# Patient Record
Sex: FEMALE | NOT HISPANIC OR LATINO | URBAN - METROPOLITAN AREA
[De-identification: names, ages, dates, MRNs, and addresses within clinical notes are randomized per-mention and may not be internally consistent; named-entity substitution may affect disease eponyms.]

---

## 2017-02-16 ENCOUNTER — IMPORTED ENCOUNTER (OUTPATIENT)
Dept: URBAN - METROPOLITAN AREA CLINIC 1 | Facility: CLINIC | Age: 82
End: 2017-02-16

## 2017-02-16 PROBLEM — H40.013: Noted: 2017-02-16

## 2017-02-16 PROBLEM — H25.813: Noted: 2017-02-16

## 2017-02-16 PROBLEM — H43.812: Noted: 2017-02-16

## 2017-02-16 PROCEDURE — 92250 FUNDUS PHOTOGRAPHY W/I&R: CPT

## 2017-02-16 PROCEDURE — 92015 DETERMINE REFRACTIVE STATE: CPT

## 2017-02-16 PROCEDURE — 92004 COMPRE OPH EXAM NEW PT 1/>: CPT

## 2017-02-16 NOTE — PATIENT DISCUSSION
1.  Cataract OU:  Visually Significant discussed the risks benefits alternatives and limitations of cataract surgery. The patient stated a full understanding and a desire to proceed with the procedure. The patient will need to return for preop appointment with cataract measurements and to have any additional questions answered and start pre-operative eye drops as directed. Schedule phaco PCL OS then ODOtherwise follow-up in 1-4 months 10/OCT/HVF2. Glaucoma Suspect OU (0.8/0.65): Stable IOP OU. Baseline disc photos OU. Further w/u after phaco. Patient is considered Low Risk. Condition was discussed with patient and patient understands. Will continue to monitor patient for any progression in condition. Patient was advised to call us with any problems questions or concerns. 3.  PVD w/o Tear OS-Patient was cautioned to call our office immediately if they experience a substantial change in their symptoms such as an increase in floaters persistent flashes loss of visual field (may appear as a shadow or a curtain) or decrease in visual acuity as these may indicate a retinal tear or detachment. 4.  Return for an appointment for edel H&P with Dr. Rafiq Oliveira.

## 2017-05-02 ENCOUNTER — IMPORTED ENCOUNTER (OUTPATIENT)
Dept: URBAN - METROPOLITAN AREA CLINIC 1 | Facility: CLINIC | Age: 82
End: 2017-05-02

## 2017-05-02 PROBLEM — H25.813: Noted: 2017-05-02

## 2017-05-02 PROCEDURE — 92136 OPHTHALMIC BIOMETRY: CPT

## 2017-05-02 NOTE — PATIENT DISCUSSION
Cataract OU:  Visually Significant discussed the risks benefits alternatives and limitations of cataract surgery. The patient stated a full understanding and a desire to proceed with the procedure. The patient will need to start pre-operative eye drops as directed. Proceed w/ phaco PCL OS then ODPt understands she will need glasses post-op to achieve her best vision at near.  RTC as scheduled for sx OS

## 2017-05-10 ENCOUNTER — IMPORTED ENCOUNTER (OUTPATIENT)
Dept: URBAN - METROPOLITAN AREA CLINIC 1 | Facility: CLINIC | Age: 82
End: 2017-05-10

## 2017-05-11 ENCOUNTER — IMPORTED ENCOUNTER (OUTPATIENT)
Dept: URBAN - METROPOLITAN AREA CLINIC 1 | Facility: CLINIC | Age: 82
End: 2017-05-11

## 2017-05-11 PROBLEM — Z96.1: Noted: 2017-05-11

## 2017-05-11 PROCEDURE — 99024 POSTOP FOLLOW-UP VISIT: CPT

## 2017-05-11 NOTE — PATIENT DISCUSSION
POD#1 CE/IOL Toric OS doing well. Slight inflammation. Continue all 3 gtts as prescribed and until gone. Ocuflox TIDPred BIDAcular QDPost op Warnings Reiterated RTC as scheduled

## 2017-05-16 ENCOUNTER — IMPORTED ENCOUNTER (OUTPATIENT)
Dept: URBAN - METROPOLITAN AREA CLINIC 1 | Facility: CLINIC | Age: 82
End: 2017-05-16

## 2017-05-16 PROBLEM — Z96.1: Noted: 2017-05-16

## 2017-05-16 PROBLEM — H25.811: Noted: 2017-05-16

## 2017-05-16 PROCEDURE — 92136 OPHTHALMIC BIOMETRY: CPT

## 2017-05-16 NOTE — PATIENT DISCUSSION
1.  Cataract OD: Visually Significant discussed the risks benefits alternatives and limitations of cataract surgery. The patient stated a full understanding and a desire to proceed with the procedure. Pt understands they will need glasses post-op to achieve their best vision at near. Phaco PCL Toric Standard technique. 2.  POW#1  CE/IOL OS (Toric) doing well.   Use Prednisolone BID OD till out Use Acular Qdaily OD till out F/u as scheduled for second eye sx

## 2017-05-24 ENCOUNTER — IMPORTED ENCOUNTER (OUTPATIENT)
Dept: URBAN - METROPOLITAN AREA CLINIC 1 | Facility: CLINIC | Age: 82
End: 2017-05-24

## 2017-05-25 ENCOUNTER — IMPORTED ENCOUNTER (OUTPATIENT)
Dept: URBAN - METROPOLITAN AREA CLINIC 1 | Facility: CLINIC | Age: 82
End: 2017-05-25

## 2017-05-25 PROBLEM — Z96.1: Noted: 2017-05-25

## 2017-05-25 PROCEDURE — 99024 POSTOP FOLLOW-UP VISIT: CPT

## 2017-05-25 NOTE — PATIENT DISCUSSION
1. POD#1 CE/IOL OD (Toric) doing well. Continue all 3 gtts as prescribed and until gone. Use Prednisolone BID OD Acular Qdaily OD Ocuflox TID OD 2. POW#1  CE/IOL OS (Toric) doing well.   Use Prednisolone BID OS till out Use Acular Qdaily OS till out RTC as scheduled

## 2017-06-16 ENCOUNTER — IMPORTED ENCOUNTER (OUTPATIENT)
Dept: URBAN - METROPOLITAN AREA CLINIC 1 | Facility: CLINIC | Age: 82
End: 2017-06-16

## 2017-06-16 PROBLEM — Z96.1: Noted: 2017-06-16

## 2017-06-16 PROCEDURE — 99024 POSTOP FOLLOW-UP VISIT: CPT

## 2017-06-16 NOTE — PATIENT DISCUSSION
1. POM#1 CE/IOL OU (Toric OU) doing well. Use Prednisolone BID OU till out Use Acular Qdaily OU till out MRX for glasses givenReturn for an appointment in 4-6 months 30/OCT/HVF with Dr. Julian Larson.

## 2017-10-30 ENCOUNTER — IMPORTED ENCOUNTER (OUTPATIENT)
Dept: URBAN - METROPOLITAN AREA CLINIC 1 | Facility: CLINIC | Age: 82
End: 2017-10-30

## 2017-10-30 PROBLEM — Z96.1: Noted: 2017-10-30

## 2017-10-30 PROBLEM — H40.1111: Noted: 2017-10-30

## 2017-10-30 PROBLEM — H40.022: Noted: 2017-10-30

## 2017-10-30 PROBLEM — H35.033: Noted: 2017-10-30

## 2017-10-30 PROBLEM — H43.813: Noted: 2017-10-30

## 2017-10-30 PROCEDURE — 92014 COMPRE OPH EXAM EST PT 1/>: CPT

## 2017-10-30 PROCEDURE — 92133 CPTRZD OPH DX IMG PST SGM ON: CPT

## 2017-10-30 PROCEDURE — 92083 EXTENDED VISUAL FIELD XM: CPT

## 2017-10-30 NOTE — PATIENT DISCUSSION
1.  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 Normal HVF. Start Lumigan OD only QHS (sample given.) Patient advised to be compliant with gtts. Condition was discussed with patient and patient understands. Will continue to monitor patient for any progression in condition. Patient was advised to call us with any problems questions or concerns. 2.  Glaucoma Suspect OS (0.6): OCT normal OS. Normal HVF. Normal IOP. Observe off drops. Patient is considered high risk. Condition was discussed with patient and patient understands. Will continue to monitor patient for any progression in condition. Patient was advised to call us with any problems questions or concerns. 3.  PVD OU- RD precautions. 4.  GR I Hypertensive Retinopathy OU- Stable continue HTN Control5. Pseudophakia OU (Torics OU)- Doing well. 6. Return for an appointment for a 10/check IOP on new regiment OD in 4-6 weeks with Dr. Wanda Olson.

## 2017-12-16 ENCOUNTER — IMPORTED ENCOUNTER (OUTPATIENT)
Dept: URBAN - METROPOLITAN AREA CLINIC 1 | Facility: CLINIC | Age: 82
End: 2017-12-16

## 2017-12-16 PROBLEM — H26.493: Noted: 2017-12-16

## 2017-12-16 PROBLEM — H40.1111: Noted: 2017-12-16

## 2017-12-16 PROBLEM — H40.022: Noted: 2017-12-16

## 2017-12-16 PROBLEM — Z96.1: Noted: 2017-12-16

## 2017-12-16 PROCEDURE — 92012 INTRM OPH EXAM EST PATIENT: CPT

## 2017-12-16 NOTE — PATIENT DISCUSSION
1.  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 Condition was discussed with patient and patient understands. Will continue to monitor patient for any progression in condition. Patient was advised to call us with any problems questions or concerns. 2.  Glaucoma Suspect OS (0.6): Normal IOP. Observe off drops. Patient is considered high risk. Condition was discussed with patient and patient understands. Will continue to monitor patient for any progression in condition. Patient was advised to call us with any problems questions or concerns. 3.  H/o PVD OU-4. H/o GR I Hypertensive Retinopathy OU5. Pseudophakia OU (Torics OU)- Doing well. 5. Return for an appointment for a 10/glare in 6 months with Dr. Skylar Quevedo.

## 2018-08-09 ENCOUNTER — IMPORTED ENCOUNTER (OUTPATIENT)
Dept: URBAN - METROPOLITAN AREA CLINIC 1 | Facility: CLINIC | Age: 83
End: 2018-08-09

## 2018-08-09 PROBLEM — H16.143: Noted: 2018-08-09

## 2018-08-09 PROBLEM — H40.022: Noted: 2018-08-09

## 2018-08-09 PROBLEM — H04.123: Noted: 2018-08-09

## 2018-08-09 PROBLEM — H10.45: Noted: 2018-08-09

## 2018-08-09 PROBLEM — H26.493: Noted: 2018-08-09

## 2018-08-09 PROBLEM — H40.1111: Noted: 2018-08-09

## 2018-08-09 PROBLEM — Z96.1: Noted: 2018-08-09

## 2018-08-09 PROCEDURE — 92012 INTRM OPH EXAM EST PATIENT: CPT

## 2018-08-09 NOTE — PATIENT DISCUSSION
1.  Allergic Conjunctivitis OU- The start of Zaditor OU BID recommended routinely until seen again. x 1 drop Durezol and Pazeo instilled OU in office. The condition was  discussed with the patient. Avoidance of allergens and cool compresses were recommended. If no improvement thin 1-2 weeks advised pt to call our office. 2. RICHARD w/ PEK OU- Recommend the switch to preservative free tears. The increase of preservative free artificial tears OU to QID were recommended routinely. 3.  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 Patient advised to be compliant with gtts. Condition was discussed with patient and patient understands. Will continue to monitor patient for any progression in condition. Patient was advised to call us with any problems questions or concerns. 4.  Glaucoma Suspect OS (0.6): Stable IOP OS. Observe off drops. Patient is considered high risk. Condition was discussed with patient and patient understands. Will continue to monitor patient for any progression in condition. Patient was advised to call us with any problems questions or concerns. 5.  PCO OU: (Posterior Capsule Opacification)   Observe and consider yag cap when pt feels pco visually significant and visual acuity decreases to appropriate level. 6. Pseudophakia OU (Torics OU)- Doing well.  7.  H/o PVD OU-8. H/o GR I Hypertensive Retinopathy OU9. Return as scheduled with Dr. Jack Quevedo.

## 2018-08-09 NOTE — PATIENT DISCUSSION
1.  Mild Open Angle Glaucoma OD (0.8) -- Good response to Lumigan OD only QHS CPM Patient advised to be compliant with gtts. Condition was discussed with patient and patient understands. Will continue to monitor patient for any progression in condition. Patient was advised to call us with any problems questions or concerns. 2.  Glaucoma Suspect OS (0.6) -- Normal IOP. Observe off drops. Patient is considered high risk. Condition was discussed with patient and patient understands. Will continue to monitor patient for any progression in condition. Patient was advised to call us with any problems questions or concerns. 3.  H/o PVD OU-4. H/o GR I Hypertensive Retinopathy OU5. Pseudophakia OU (Torics OU)- Doing well.

## 2018-08-09 NOTE — PATIENT DISCUSSION
Mild Open Angle Glaucoma OD-Patient to continue with current gtt regimen. Patient advised to be compliant with gtts. Condition was discussed with patient and patient understands. Will continue to monitor patient for any progression in condition. Patient was advised to call us with any problems questions or concerns.

## 2018-11-08 NOTE — PATIENT DISCUSSION
Patient advised of the right to post-operative care by the surgeon. Patient is fully informed of, and agreed to, co-management with their primary optometric physician. Post-operative care by the surgeon is not medically necessary and co-management is clinically appropriate. Patient has received itemization of fees related to cataract surgery. Transfer of care letter completed for the patient. Transfer care of right eye to Dr. Melanie Rees on 11/8/2018. Patient instructed to call immediately if any new distortion, blurring, decreased vision or eye pain.

## 2018-11-08 NOTE — PROCEDURE NOTE: SURGICAL
"<span style=""font-weight:bold;"">MR #:</span>&nbsp; [id number]<RR /><br /><span style=""font-weight:bold;"">PREOPERATIVE DIAGNOSIS:</span>&nbsp; Cataract

## 2018-11-15 NOTE — PROCEDURE NOTE: SURGICAL
"<span style=""font-weight:bold;"">MR #:</span>&nbsp; [id number]<RE /><br /><span style=""font-weight:bold;"">PREOPERATIVE DIAGNOSIS:</span>&nbsp; Cataract

## 2018-11-15 NOTE — PATIENT DISCUSSION
Patient advised of the right to post-operative care by the surgeon. Patient is fully informed of, and agreed to, co-management with their primary optometric physician. Post-operative care by the surgeon is not medically necessary and co-management is clinically appropriate. Patient has received itemization of fees related to cataract surgery. Transfer of care letter completed for the patient. Transfer care of left eye to Dr. Adán Lara on 11/15/18. Patient instructed to call immediately if any new distortion, blurring, decreased vision or eye pain.

## 2018-12-10 ENCOUNTER — IMPORTED ENCOUNTER (OUTPATIENT)
Dept: URBAN - METROPOLITAN AREA CLINIC 1 | Facility: CLINIC | Age: 83
End: 2018-12-10

## 2018-12-10 PROBLEM — H40.1111: Noted: 2018-12-10

## 2018-12-10 PROBLEM — H40.022: Noted: 2018-12-10

## 2018-12-10 PROCEDURE — 92133 CPTRZD OPH DX IMG PST SGM ON: CPT

## 2018-12-10 PROCEDURE — 92014 COMPRE OPH EXAM EST PT 1/>: CPT

## 2018-12-10 NOTE — PATIENT DISCUSSION
1.  Mild Open Angle Glaucoma OD (CD 0.8)- IOP OD stable on Latanoprost OCT shows no progression OU. Cont Latanoprost QHS OD Only. CPM. Patient advised to be compliant with gtts. 2.  Glaucoma Suspect OS (CD 0.6): IOP stable OS on no gtts. OCT shows no progression OU. Patient is considered high risk. 3.  PVD OU- stable RD precautions. 4.  GR I Hypertensive Retinopathy OU5. Pseudophakia OU (Toric OU6. RICHARD w/ PEK OU- Increase PF ATs to TID OU Routinely. Letter to [address] 56 for an appointment in 6 mo 10 VF 24-2 OU with Dr. Fatuma Mann.

## 2019-06-24 ENCOUNTER — IMPORTED ENCOUNTER (OUTPATIENT)
Dept: URBAN - METROPOLITAN AREA CLINIC 1 | Facility: CLINIC | Age: 84
End: 2019-06-24

## 2019-06-24 PROBLEM — H40.022: Noted: 2019-06-24

## 2019-06-24 PROBLEM — H40.1111: Noted: 2019-06-24

## 2019-06-24 PROCEDURE — 92083 EXTENDED VISUAL FIELD XM: CPT

## 2019-06-24 PROCEDURE — 92012 INTRM OPH EXAM EST PATIENT: CPT

## 2019-06-24 NOTE — PATIENT DISCUSSION
1.  Mild Open Angle Glaucoma OD (CD 0.8)- IOP OD stable on Latanoprost VF shows no progression OU. Cont Latanoprost QHS OD Only. CPM. Patient advised to be compliant with gtts. 2.  Glaucoma Suspect OS (CD 0.6): IOP stable OS on no gtts. VF shows no progression OU. Patient is considered high risk. 3.  H/o PVD OU 4. H/o GR I Hypertensive Retinopathy OU5. Pseudophakia OU (Toric OU)6. RICHARD w/ PEK OU- Cont PF ATs to TID OU Routinely. 7.  PCO OU- Consider YAG PRN. Return for an appointment in 6 mo 30 OCT with Dr. Neftali Reece.

## 2019-12-17 ENCOUNTER — IMPORTED ENCOUNTER (OUTPATIENT)
Dept: URBAN - METROPOLITAN AREA CLINIC 1 | Facility: CLINIC | Age: 84
End: 2019-12-17

## 2019-12-17 PROBLEM — H40.1111: Noted: 2019-12-17

## 2019-12-17 PROBLEM — H16.143: Noted: 2019-12-17

## 2019-12-17 PROBLEM — H40.022: Noted: 2019-12-17

## 2019-12-17 PROBLEM — Z96.1: Noted: 2019-12-17

## 2019-12-17 PROBLEM — H26.493: Noted: 2019-12-17

## 2019-12-17 PROBLEM — H35.033: Noted: 2019-12-17

## 2019-12-17 PROBLEM — H04.123: Noted: 2019-12-17

## 2019-12-17 PROBLEM — H43.813: Noted: 2019-12-17

## 2019-12-17 PROCEDURE — 92015 DETERMINE REFRACTIVE STATE: CPT

## 2019-12-17 PROCEDURE — 92014 COMPRE OPH EXAM EST PT 1/>: CPT

## 2019-12-17 PROCEDURE — 92133 CPTRZD OPH DX IMG PST SGM ON: CPT

## 2019-12-17 NOTE — PATIENT DISCUSSION
1.  Mild Open Angle Glaucoma OD (CD 0.80) - OCT shows slight decrease in RNFL. IOP stable cont Latanoprost QHS OD only. Patient advised to be compliant with gtts. Condition was discussed with patient and patient understands. Will continue to monitor patient for any progression in condition. Patient was advised to call us with any problems questions or concerns. 2.  Glaucoma Suspect OS (CD 0.60): OCT shows slight decrease in RNFL. Patient is considered high risk. Condition was discussed with patient and patient understands. Will continue to monitor patient for any progression in condition. Patient was advised to call us with any problems questions or concerns. 3.  RICHARD w/ PEK OU- Recommend ATs TID OU routinely 4. PCO OU- Visually Significant *secondary to glare*; schedule YAG Cap OD then OS. Pros cons risks and benefits. 5.  Pseudophakia OU - (Toric OU) 6. GR I Hypertensive Retinopathy OU- Stable continue HTN Control7. PVD OU - RD precautions. Return for an appointment in YAG Cap OD then OS with Dr. Moira Adrian.

## 2020-01-16 NOTE — PROCEDURE NOTE: SURGICAL
"<p>Prior to commencing surgery patient identification, surgical procedure, site, and side were confirmed by Dr. Romo.&nbsp; Following topical proparacaine anesthesia, the patient was positioned at the YAG laser, a contact lens coupled to the cornea of the right eye with methylcellulose and an axial posterior capsulotomy performed without complication using 3.5 Mj x 14. Attention was then turned to the left eye and a contact lens coupled to the cornea of the left eye with methylcellulose and an axial posterior capsulotomy performed without complication using 3.5 Mj x 12. One drop of Alphagan was instilled in both eyes and the patient returned to the holding area having tolerated the procedure well and without complication. </p><p><span style=""font-size:12px;"">MRN:[id number]</span><br /></p>"

## 2020-01-17 ENCOUNTER — IMPORTED ENCOUNTER (OUTPATIENT)
Dept: URBAN - METROPOLITAN AREA CLINIC 1 | Facility: CLINIC | Age: 85
End: 2020-01-17

## 2020-01-17 PROBLEM — H26.491: Noted: 2020-01-17

## 2020-01-17 PROCEDURE — 66821 AFTER CATARACT LASER SURGERY: CPT

## 2020-01-31 ENCOUNTER — IMPORTED ENCOUNTER (OUTPATIENT)
Dept: URBAN - METROPOLITAN AREA CLINIC 1 | Facility: CLINIC | Age: 85
End: 2020-01-31

## 2020-01-31 PROBLEM — H26.492: Noted: 2020-01-31

## 2020-01-31 PROCEDURE — 66821 AFTER CATARACT LASER SURGERY: CPT

## 2020-01-31 NOTE — PATIENT DISCUSSION
YAG CAP OS: (Consent signed and scanned into attachments) 1 gtt Prolensa applied. The purpose and nature of the procedure possible alternative methods of treatment the risks involved and the possibility of complications were discussed with patient. The Patient wishes to proceed and the consent was signed. The laser was then performed under topical anesthesia with no complications. Post op instructions were given to patient as well as a follow-up appointment. Patient was advised to call our office if any questions or concerns.   RTC 1-6 week YAG PO.

## 2020-03-10 ENCOUNTER — IMPORTED ENCOUNTER (OUTPATIENT)
Dept: URBAN - METROPOLITAN AREA CLINIC 1 | Facility: CLINIC | Age: 85
End: 2020-03-10

## 2020-03-10 PROBLEM — Z09: Noted: 2020-03-10

## 2020-03-10 PROCEDURE — 99024 POSTOP FOLLOW-UP VISIT: CPT

## 2020-03-10 NOTE — PATIENT DISCUSSION
PO YAG Cap OS -- Good result. MRX given. Return for an appointment in 4 month 10/VF with Dr. Mary Martini.

## 2020-07-10 ENCOUNTER — IMPORTED ENCOUNTER (OUTPATIENT)
Dept: URBAN - METROPOLITAN AREA CLINIC 1 | Facility: CLINIC | Age: 85
End: 2020-07-10

## 2020-07-10 PROBLEM — H40.1131: Noted: 2020-07-10

## 2020-07-10 PROCEDURE — 92083 EXTENDED VISUAL FIELD XM: CPT

## 2020-07-10 PROCEDURE — 92012 INTRM OPH EXAM EST PATIENT: CPT

## 2020-07-10 NOTE — PATIENT DISCUSSION
1.  Mild Open Angle Glaucoma OU (CD 0.80/0.60) - HVF shows superior arcuate OU. Will now use Latanoprost QHS OU. Patient advised to be compliant with gtts. 2.  RICHARD w/ PEK OU- Recommend ATs TID OU routinely 3. Pseudophakia OU - (Toric OU) H/o YAG Cap OU 4. H/o GR I Hypertensive Retinopathy OU5. H/o PVD OUReturn for an appointment in 6 months 30/OCT with Dr. Claudeen Cobble.

## 2020-07-10 NOTE — PATIENT DISCUSSION
RICHARD w/ PEK OU- Recommend ATs TID OU routinely 3. Pseudophakia OU - (Toric OU) H/o YAG Cap OU 4. H/o GR I Hypertensive Retinopathy OU- Stable continue HTN Control5. H/o PVD OUReturn for an appointment in 6 months 30/OCT with Dr. Royce Grace.

## 2021-01-19 ENCOUNTER — IMPORTED ENCOUNTER (OUTPATIENT)
Dept: URBAN - METROPOLITAN AREA CLINIC 1 | Facility: CLINIC | Age: 86
End: 2021-01-19

## 2021-01-19 PROBLEM — H04.123: Noted: 2021-01-19

## 2021-01-19 PROBLEM — H40.1131: Noted: 2021-01-19

## 2021-01-19 PROBLEM — H16.143: Noted: 2021-01-19

## 2021-01-19 PROBLEM — H35.033: Noted: 2021-01-19

## 2021-01-19 PROCEDURE — 92133 CPTRZD OPH DX IMG PST SGM ON: CPT

## 2021-01-19 PROCEDURE — 92014 COMPRE OPH EXAM EST PT 1/>: CPT

## 2021-01-19 NOTE — PATIENT DISCUSSION
1.  Mild Open Angle Glaucoma OU (CD 0.80/0.60) - No progression by OCT OU. Continue Latanoprost QHS OU. Patient advised to be compliant with gtts. 2.  RICHARD w/ PEK OU -- Recommend ATs TID OU routinely 3. Pseudophakia OU -- (Toric OU) H/o YAG Cap OU 4. GR I Hypertensive Retinopathy OU -- Cont HTN control. 5.  PVD OU -- RD precautions Return for an appointment in 6 months for a 10/HVF 24-2 with Dr. Moira Adrian.

## 2021-10-08 ENCOUNTER — IMPORTED ENCOUNTER (OUTPATIENT)
Dept: URBAN - METROPOLITAN AREA CLINIC 1 | Facility: CLINIC | Age: 86
End: 2021-10-08

## 2021-10-08 PROBLEM — H04.123: Noted: 2021-10-08

## 2021-10-08 PROBLEM — H16.143: Noted: 2021-10-08

## 2021-10-08 PROBLEM — H40.1131: Noted: 2021-10-08

## 2021-10-08 PROCEDURE — 92083 EXTENDED VISUAL FIELD XM: CPT

## 2021-10-08 PROCEDURE — 92012 INTRM OPH EXAM EST PATIENT: CPT

## 2021-10-08 NOTE — PATIENT DISCUSSION
1.  Mild Open Angle Glaucoma OU (CD 0.80/0.60) - HVF today possible superior Arc vs artifact OU. IOP stable OU. Continue Latanoprost QHS OU (erx'd). Patient advised to be compliant with gtts. 2.  RICHARD w/ PEK OU -- Recommend ATs TID OU routinely 3. Pseudophakia OU -- (Toric OU) H/o YAG Cap OU 4. GR I Hypertensive Retinopathy OU -- Cont HTN control. 5.  PVD OU -- RD precautions Return for an appointment in 6 months for a 30/OCT with Dr. Xenia Hobbs.

## 2022-04-02 ASSESSMENT — VISUAL ACUITY
OS_CC: 20/25
OS_CC: 20/30
OD_CC: 20/20-1
OS_CC: J5
OD_CC: 20/25
OD_CC: 20/20-1
OD_CC: 20/25
OS_CC: 20/80+1
OS_CC: 20/25
OD_CC: J3
OS_GLARE: 20/100
OS_CC: 20/30-1
OD_GLARE: 20/100
OS_SC: 20/20
OS_CC: 20/30-1
OS_SC: 20/60-1
OS_CC: 20/30-2
OS_CC: 20/20-2
OS_SC: 20/25-2
OD_PH: SC 20/20
OS_CC: 20/30-2
OS_CC: 20/30-1
OS_SC: 20/50
OD_SC: 20/20
OS_CC: 20/40
OD_SC: 20/40
OD_SC: 20/40
OD_CC: 20/20
OD_CC: 20/50-2
OS_SC: 20/20
OD_SC: 20/20
OD_CC: 20/40
OD_SC: 20/20-1
OD_SC: 20/40
OS_CC: 20/20-2
OD_CC: 20/20-1
OD_CC: 20/25-1
OD_CC: 20/20

## 2022-04-02 ASSESSMENT — TONOMETRY
OS_IOP_MMHG: 14
OS_IOP_MMHG: 14
OD_IOP_MMHG: 13
OD_IOP_MMHG: 14
OD_IOP_MMHG: 16
OS_IOP_MMHG: 17
OS_IOP_MMHG: 16
OS_IOP_MMHG: 15
OS_IOP_MMHG: 14
OS_IOP_MMHG: 15
OS_IOP_MMHG: 13
OD_IOP_MMHG: 14
OS_IOP_MMHG: 17
OD_IOP_MMHG: 14
OS_IOP_MMHG: 15
OD_IOP_MMHG: 18
OS_IOP_MMHG: 17
OD_IOP_MMHG: 15
OD_IOP_MMHG: 15
OD_IOP_MMHG: 16
OS_IOP_MMHG: 16
OS_IOP_MMHG: 15
OD_IOP_MMHG: 14
OS_IOP_MMHG: 14
OS_IOP_MMHG: 16
OD_IOP_MMHG: 16
OS_IOP_MMHG: 18
OD_IOP_MMHG: 15
OD_IOP_MMHG: 15
OD_IOP_MMHG: 13

## 2022-07-08 ENCOUNTER — ESTABLISHED PATIENT (OUTPATIENT)
Dept: URBAN - METROPOLITAN AREA CLINIC 1 | Facility: CLINIC | Age: 87
End: 2022-07-08

## 2022-07-08 DIAGNOSIS — Z98.890: ICD-10-CM

## 2022-07-08 DIAGNOSIS — H35.033: ICD-10-CM

## 2022-07-08 DIAGNOSIS — H16.143: ICD-10-CM

## 2022-07-08 DIAGNOSIS — H04.123: ICD-10-CM

## 2022-07-08 DIAGNOSIS — H40.1131: ICD-10-CM

## 2022-07-08 DIAGNOSIS — H43.813: ICD-10-CM

## 2022-07-08 DIAGNOSIS — Z96.1: ICD-10-CM

## 2022-07-08 PROCEDURE — 99214 OFFICE O/P EST MOD 30 MIN: CPT

## 2022-07-08 PROCEDURE — 92133 CPTRZD OPH DX IMG PST SGM ON: CPT

## 2022-07-08 ASSESSMENT — VISUAL ACUITY
OS_SC: 20/20
OD_SC: 20/25

## 2022-07-08 ASSESSMENT — TONOMETRY
OD_IOP_MMHG: 14
OS_IOP_MMHG: 15

## 2022-07-08 NOTE — PATIENT DISCUSSION
(CD 0.80/0.60) - OCT ON performed today is WNL, stbale OU. IOP stable OU. Continue Latanoprost QHS OU (erx'd). Patient advised to be compliant with gtts.

## 2023-01-09 ENCOUNTER — FOLLOW UP (OUTPATIENT)
Dept: URBAN - METROPOLITAN AREA CLINIC 1 | Facility: CLINIC | Age: 88
End: 2023-01-09

## 2023-01-09 DIAGNOSIS — H40.1131: ICD-10-CM

## 2023-01-09 PROCEDURE — 99213 OFFICE O/P EST LOW 20 MIN: CPT

## 2023-01-09 ASSESSMENT — VISUAL ACUITY
OD_CC: J1+
OS_SC: 20/25+2
OS_CC: J1+
OD_SC: 20/20-2

## 2023-01-09 ASSESSMENT — TONOMETRY
OD_IOP_MMHG: 14
OS_IOP_MMHG: 14

## 2023-01-09 NOTE — PATIENT DISCUSSION
(CD 0.80/0.60) IOP stable OU. Continue Latanoprost QHS OU. Patient advised to be compliant with gtts.

## 2023-11-22 ENCOUNTER — COMPREHENSIVE EXAM (OUTPATIENT)
Dept: URBAN - METROPOLITAN AREA CLINIC 1 | Facility: CLINIC | Age: 88
End: 2023-11-22

## 2023-11-22 DIAGNOSIS — H16.143: ICD-10-CM

## 2023-11-22 DIAGNOSIS — H40.1131: ICD-10-CM

## 2023-11-22 DIAGNOSIS — H35.033: ICD-10-CM

## 2023-11-22 DIAGNOSIS — H04.123: ICD-10-CM

## 2023-11-22 PROCEDURE — 92133 CPTRZD OPH DX IMG PST SGM ON: CPT

## 2023-11-22 PROCEDURE — 99214 OFFICE O/P EST MOD 30 MIN: CPT

## 2023-11-22 ASSESSMENT — TONOMETRY
OD_IOP_MMHG: 15
OS_IOP_MMHG: 15

## 2023-11-22 ASSESSMENT — VISUAL ACUITY
OS_SC: 20/20
OD_SC: 20/20

## 2024-05-10 ENCOUNTER — FOLLOW UP (OUTPATIENT)
Dept: URBAN - METROPOLITAN AREA CLINIC 1 | Facility: CLINIC | Age: 89
End: 2024-05-10

## 2024-05-10 DIAGNOSIS — H40.1131: ICD-10-CM

## 2024-05-10 PROCEDURE — 92083 EXTENDED VISUAL FIELD XM: CPT

## 2024-05-10 PROCEDURE — 99213 OFFICE O/P EST LOW 20 MIN: CPT

## 2024-05-10 ASSESSMENT — TONOMETRY
OD_IOP_MMHG: 13
OS_IOP_MMHG: 14

## 2024-05-10 ASSESSMENT — VISUAL ACUITY
OD_SC: 20/20
OS_SC: 20/25

## 2024-12-06 ENCOUNTER — COMPREHENSIVE EXAM (OUTPATIENT)
Age: 89
End: 2024-12-06

## 2024-12-06 DIAGNOSIS — H40.1131: ICD-10-CM

## 2024-12-06 PROCEDURE — 92133 CPTRZD OPH DX IMG PST SGM ON: CPT

## 2024-12-06 PROCEDURE — 99214 OFFICE O/P EST MOD 30 MIN: CPT

## 2025-06-09 ENCOUNTER — FOLLOW UP (OUTPATIENT)
Age: 89
End: 2025-06-09

## 2025-06-09 DIAGNOSIS — H40.1131: ICD-10-CM

## 2025-06-09 PROCEDURE — 92083 EXTENDED VISUAL FIELD XM: CPT

## 2025-06-09 PROCEDURE — 99213 OFFICE O/P EST LOW 20 MIN: CPT
